# Patient Record
Sex: FEMALE | Race: WHITE | NOT HISPANIC OR LATINO | URBAN - METROPOLITAN AREA
[De-identification: names, ages, dates, MRNs, and addresses within clinical notes are randomized per-mention and may not be internally consistent; named-entity substitution may affect disease eponyms.]

---

## 2024-07-03 ENCOUNTER — OFFICE VISIT (OUTPATIENT)
Dept: URGENT CARE | Facility: CLINIC | Age: 64
End: 2024-07-03
Payer: MEDICAID

## 2024-07-03 VITALS
HEART RATE: 66 BPM | RESPIRATION RATE: 20 BRPM | DIASTOLIC BLOOD PRESSURE: 68 MMHG | TEMPERATURE: 98 F | OXYGEN SATURATION: 93 % | SYSTOLIC BLOOD PRESSURE: 130 MMHG

## 2024-07-03 DIAGNOSIS — Z76.0 MEDICATION REFILL: ICD-10-CM

## 2024-07-03 DIAGNOSIS — R05.3 PERSISTENT COUGH FOR 3 WEEKS OR LONGER: ICD-10-CM

## 2024-07-03 DIAGNOSIS — J20.9 ACUTE BRONCHITIS, UNSPECIFIED ORGANISM: Primary | ICD-10-CM

## 2024-07-03 PROCEDURE — 99203 OFFICE O/P NEW LOW 30 MIN: CPT | Performed by: PHYSICIAN ASSISTANT

## 2024-07-03 PROCEDURE — 1126F AMNT PAIN NOTED NONE PRSNT: CPT | Performed by: PHYSICIAN ASSISTANT

## 2024-07-03 RX ORDER — AMOXICILLIN AND CLAVULANATE POTASSIUM 875; 125 MG/1; MG/1
1 TABLET, FILM COATED ORAL 2 TIMES DAILY
Qty: 20 TABLET | Refills: 0 | Status: SHIPPED | OUTPATIENT
Start: 2024-07-03 | End: 2024-07-03

## 2024-07-03 RX ORDER — BENZONATATE 100 MG/1
100 CAPSULE ORAL 3 TIMES DAILY PRN
Qty: 30 CAPSULE | Refills: 0 | Status: SHIPPED | OUTPATIENT
Start: 2024-07-03 | End: 2024-07-03

## 2024-07-03 RX ORDER — ALBUTEROL SULFATE 90 UG/1
2 AEROSOL, METERED RESPIRATORY (INHALATION) EVERY 6 HOURS PRN
Qty: 18 G | Refills: 0 | Status: SHIPPED | OUTPATIENT
Start: 2024-07-03 | End: 2025-07-03

## 2024-07-03 RX ORDER — AMOXICILLIN AND CLAVULANATE POTASSIUM 875; 125 MG/1; MG/1
1 TABLET, FILM COATED ORAL 2 TIMES DAILY
Qty: 20 TABLET | Refills: 0 | Status: SHIPPED | OUTPATIENT
Start: 2024-07-03 | End: 2024-07-13

## 2024-07-03 RX ORDER — BENZONATATE 100 MG/1
100 CAPSULE ORAL 3 TIMES DAILY PRN
Qty: 30 CAPSULE | Refills: 0 | Status: SHIPPED | OUTPATIENT
Start: 2024-07-03 | End: 2024-07-13

## 2024-07-03 ASSESSMENT — PAIN SCALES - GENERAL: PAINLEVEL: 0-NO PAIN

## 2024-07-03 ASSESSMENT — ENCOUNTER SYMPTOMS: COUGH: 1

## 2024-07-03 NOTE — PROGRESS NOTES
Subjective   Patient ID: Essence Weeks is a 63 y.o. female.    Patient is a 63-year-old female who complains of ongoing loose, productive cough that she has been experiencing for the past 4 weeks.  Patient does have asthma and states she has not experienced any increased episodes of wheezing.  Patient denies fever, chills or myalgia.  Patient reports no congestion, sinus pressure, ear pain or sore throat.  Patient states she is in need of a refill of her albuterol MDI.      Cough    The following portions of the chart were reviewed this encounter and updated as appropriate:  Allergies       Review of Systems   Respiratory:  Positive for cough.    All other systems reviewed and are negative.  Objective   Physical Exam  Vitals and nursing note reviewed.   Constitutional:       Appearance: Normal appearance. She is obese.   HENT:      Head: Normocephalic and atraumatic.      Right Ear: External ear normal.      Left Ear: External ear normal.      Nose: Nose normal.      Mouth/Throat:      Mouth: Mucous membranes are moist.      Pharynx: Oropharynx is clear. No oropharyngeal exudate.   Eyes:      Extraocular Movements: Extraocular movements intact.      Conjunctiva/sclera: Conjunctivae normal.      Pupils: Pupils are equal, round, and reactive to light.   Cardiovascular:      Rate and Rhythm: Normal rate and regular rhythm.      Pulses: Normal pulses.      Heart sounds: Normal heart sounds.   Pulmonary:      Effort: Pulmonary effort is normal. No respiratory distress.      Breath sounds: Normal breath sounds. No stridor. No wheezing, rhonchi or rales.   Musculoskeletal:      Cervical back: Normal range of motion and neck supple.   Skin:     General: Skin is warm and dry.      Capillary Refill: Capillary refill takes less than 2 seconds.   Neurological:      General: No focal deficit present.      Mental Status: She is alert and oriented to person, place, and time.   Psychiatric:         Mood and Affect: Mood normal.          Behavior: Behavior normal.         Thought Content: Thought content normal.         Judgment: Judgment normal.     Assessment/Plan   Physical exam findings as noted above.  Patient was provided with prescriptions for Augmentin 875-125 mg, Tessalon 100 mg and albuterol MDI.  Supportive care instructions were discussed and the patient verbalizes good understanding of same.    CLINICAL IMPRESSION:  Acute Bronchitis;  Persistent Cough;  Medication Refill    Diagnoses and all orders for this visit:  Acute bronchitis, unspecified organism  -     amoxicillin-pot clavulanate (Augmentin) 875-125 mg tablet; Take 1 tablet by mouth 2 times a day for 10 days.  -     benzonatate (Tessalon Perles) 100 mg capsule; Take 1 capsule (100 mg) by mouth 3 times a day as needed for cough for up to 10 days.  Persistent cough for 3 weeks or longer  Medication refill  -     albuterol 90 mcg/actuation inhaler; Inhale 2 puffs every 6 hours if needed for wheezing.    Patient disposition: Home

## 2024-11-08 ENCOUNTER — HOSPITAL ENCOUNTER (OUTPATIENT)
Dept: RADIOLOGY | Facility: CLINIC | Age: 64
Discharge: HOME | End: 2024-11-08
Payer: MEDICARE

## 2024-11-08 DIAGNOSIS — J84.9 INTERSTITIAL PULMONARY DISEASE, UNSPECIFIED: ICD-10-CM

## 2024-11-08 PROCEDURE — 71250 CT THORAX DX C-: CPT

## 2024-11-19 ENCOUNTER — APPOINTMENT (OUTPATIENT)
Dept: OTOLARYNGOLOGY | Facility: CLINIC | Age: 64
End: 2024-11-19
Payer: MEDICARE

## 2025-01-08 ENCOUNTER — APPOINTMENT (OUTPATIENT)
Dept: SURGERY | Facility: CLINIC | Age: 65
End: 2025-01-08
Payer: MEDICARE

## 2025-01-08 VITALS — SYSTOLIC BLOOD PRESSURE: 120 MMHG | DIASTOLIC BLOOD PRESSURE: 70 MMHG | HEART RATE: 65 BPM | OXYGEN SATURATION: 95 %

## 2025-01-08 DIAGNOSIS — K21.9 CHEST PAIN DUE TO GERD: ICD-10-CM

## 2025-01-08 DIAGNOSIS — K80.21 CALCULUS OF GALLBLADDER WITH BILIARY OBSTRUCTION BUT WITHOUT CHOLECYSTITIS: ICD-10-CM

## 2025-01-08 DIAGNOSIS — R07.9 CHEST PAIN DUE TO GERD: ICD-10-CM

## 2025-01-08 DIAGNOSIS — K43.9 VENTRAL HERNIA WITHOUT OBSTRUCTION OR GANGRENE: ICD-10-CM

## 2025-01-08 DIAGNOSIS — K44.9 HIATAL HERNIA: Primary | ICD-10-CM

## 2025-01-08 PROCEDURE — 1036F TOBACCO NON-USER: CPT | Performed by: SURGERY

## 2025-01-08 PROCEDURE — 99204 OFFICE O/P NEW MOD 45 MIN: CPT | Performed by: SURGERY

## 2025-01-08 PROCEDURE — 1159F MED LIST DOCD IN RCRD: CPT | Performed by: SURGERY

## 2025-01-08 PROCEDURE — 1160F RVW MEDS BY RX/DR IN RCRD: CPT | Performed by: SURGERY

## 2025-01-08 RX ORDER — METFORMIN HYDROCHLORIDE 500 MG/1
500 TABLET ORAL
COMMUNITY

## 2025-01-08 RX ORDER — GABAPENTIN 100 MG/1
100 CAPSULE ORAL
COMMUNITY
Start: 2024-11-25 | End: 2025-02-23

## 2025-01-08 RX ORDER — CHOLECALCIFEROL (VITAMIN D3) 1250 MCG
50000 TABLET ORAL
COMMUNITY

## 2025-01-08 RX ORDER — HYDROXYCHLOROQUINE SULFATE 200 MG/1
1 TABLET, FILM COATED ORAL 2 TIMES DAILY
COMMUNITY

## 2025-01-08 RX ORDER — CETIRIZINE HYDROCHLORIDE 10 MG/1
10 TABLET ORAL DAILY
COMMUNITY

## 2025-01-08 RX ORDER — BUDESONIDE AND FORMOTEROL FUMARATE DIHYDRATE 160; 4.5 UG/1; UG/1
AEROSOL RESPIRATORY (INHALATION)
COMMUNITY

## 2025-01-08 RX ORDER — HYDROGEN PEROXIDE 3 %
1 SOLUTION, NON-ORAL MISCELLANEOUS
COMMUNITY
Start: 2024-04-24

## 2025-01-08 RX ORDER — PREDNISONE 10 MG/1
TABLET ORAL
COMMUNITY
Start: 2024-12-24

## 2025-01-08 NOTE — PROGRESS NOTES
Subjective   Patient ID: Essence Weeks is a 64 y.o. female who presents for Cholelithiasis (Per CT 11/12/24).  As well as severe GERD, not controlled with diet and medications.  Patient cannot sleep.  Patient has the severe cough most likely secondary to GERD.  Patient never had a EGD.  The symptoms are severe that patient cannot sleep    HPI as described above.    Review of Systems respiratory consistent with a cough, shortness of breath.  Constitutional with morbid obesity.  GI consistent with abdominal discomfort.  Review of all other 10 system is negative.  Physical Exam pupils equal bilaterally, mucosa moist, bilateral breath sounds, crackles at the base of the lungs bilaterally.  Regular rhythm and rate, no murmurs, palpable peripheral pulses, no focal neurological or motor deficits.  Musculoskeletal exam within normal limits, ENT exam within normal limits.  Cannot clearly identify abdominal wall hernia on the physical exam.    Objective CT scan on November 12, 2024 showed pulmonary infiltrates, cholelithiasis, fatty liver.  Multiple noncalcified pulmonary nodules.    No diagnosis found.   Patient Active Problem List   Diagnosis    Acute bronchitis    Persistent cough for 3 weeks or longer    Medication refill      Allergies   Allergen Reactions    Zithromax Z-Kenji [Azithromycin] Unknown      Medication Documentation Review Audit       Reviewed by Collins Peacock MD (Physician) on 01/08/25 at 1634      Medication Order Taking? Sig Documenting Provider Last Dose Status   albuterol 90 mcg/actuation inhaler [id number]  Inhale 2 puffs every 6 hours if needed for wheezing. Shakeel Velasco PA-C  Active   budesonide-formoteroL (Symbicort) 160-4.5 mcg/actuation inhaler [id number]   Historical Provider, MD  Active   cetirizine (ZyrTEC) 10 mg tablet [id number]  Take 1 tablet (10 mg) by mouth once daily. Historical Provider, MD  Active   Dialyvite Vitamin D3 Max 1,250 mcg (50,000 unit) tablet [id number]  Take 1 tablet  (50,000 Units) by mouth. Historical Provider, MD  Active   esomeprazole (NexIUM) 20 mg DR capsule [id number] Yes Take 1 capsule (20 mg) by mouth every 12 hours. Historical Provider, MD  Active   gabapentin (Neurontin) 100 mg capsule [id number] Yes 1 capsule (100 mg). Historical Provider, MD  Active   hydroxychloroquine (Plaquenil) 200 mg tablet [id number]  Take 1 tablet (200 mg) by mouth 2 times a day. Historical Provider, MD  Active   metFORMIN (Glucophage) 500 mg tablet [id number]  1 tablet (500 mg). Historical Provider, MD  Active   predniSONE (Deltasone) 10 mg tablet [id number] Yes TAKE 3 TABLETS BY MOUTH EVERY DAY FOR 7 DAYS Historical Provider, MD  Active                    Past Medical History:   Diagnosis Date    Acute tonsillitis, unspecified     Infection of tonsil    Other allergy status, other than to drugs and biological substances     History of environmental allergies    Personal history of other diseases of the nervous system and sense organs     History of sleep apnea    Personal history of other diseases of the nervous system and sense organs     History of hearing loss    Personal history of other diseases of the nervous system and sense organs     History of ear infections    Type 2 diabetes mellitus without complications ([facility]) 01/13/2016    Diabetes mellitus    Unspecified asthma, uncomplicated ([facility]) 10/19/2017    Asthma, unspecified asthma severity, unspecified whether complicated, unspecified whether persistent     Social History     Tobacco Use   Smoking Status Never   Smokeless Tobacco Never     No family history on file.   Past Surgical History:   Procedure Laterality Date    HYSTERECTOMY  04/07/2015    Hysterectomy    MR HEAD ANGIO WO IV CONTRAST  5/17/2018    MR HEAD ANGIO WO IV CONTRAST 5/17/2018 [facility] [place] [facility]CILLARY [facility]GAC[facility]    NASAL SEPTUM SURGERY  04/07/2015    Nasal Septal Deviation Repair    TONSILLECTOMY  04/07/2015    Tonsillectomy       Assessment/Plan   Patient with symptomatic  cholelithiasis, possible abdominal wall hernia.  The patient has scheduled CT scan of the abdomen pelvis tomorrow, and we will follow-up on results.  Patient had the symptoms of severe reflux.  Patient has indication for EGD for assessment of the hiatal hernia, as well as severity of reflux.  Then follow-up in office to discuss treatment of the reflux, assessment for ventral hernia, as well as benefit of surgical intervention for symptomatic cholelithiasis      Collins Peacock MD

## 2025-01-09 ENCOUNTER — HOSPITAL ENCOUNTER (OUTPATIENT)
Dept: RADIOLOGY | Facility: CLINIC | Age: 65
End: 2025-01-09
Payer: MEDICARE

## 2025-01-09 ENCOUNTER — APPOINTMENT (OUTPATIENT)
Dept: RADIOLOGY | Facility: CLINIC | Age: 65
End: 2025-01-09
Payer: MEDICARE

## 2025-03-27 ENCOUNTER — APPOINTMENT (OUTPATIENT)
Dept: GASTROENTEROLOGY | Facility: HOSPITAL | Age: 65
End: 2025-03-27
Payer: MEDICARE

## 2025-06-30 ENCOUNTER — HOSPITAL ENCOUNTER (OUTPATIENT)
Dept: RADIOLOGY | Facility: CLINIC | Age: 65
End: 2025-06-30
Payer: MEDICARE

## 2025-06-30 ENCOUNTER — APPOINTMENT (OUTPATIENT)
Dept: RADIOLOGY | Facility: CLINIC | Age: 65
End: 2025-06-30
Payer: MEDICARE

## 2025-07-29 ENCOUNTER — APPOINTMENT (OUTPATIENT)
Dept: GASTROENTEROLOGY | Facility: CLINIC | Age: 65
End: 2025-07-29
Payer: MEDICARE